# Patient Record
Sex: FEMALE | Race: ASIAN | NOT HISPANIC OR LATINO | Employment: UNEMPLOYED | URBAN - METROPOLITAN AREA
[De-identification: names, ages, dates, MRNs, and addresses within clinical notes are randomized per-mention and may not be internally consistent; named-entity substitution may affect disease eponyms.]

---

## 2023-08-04 ENCOUNTER — ANCILLARY PROCEDURE (OUTPATIENT)
Dept: GENERAL RADIOLOGY | Facility: CLINIC | Age: 48
End: 2023-08-04
Attending: PODIATRIST
Payer: COMMERCIAL

## 2023-08-04 ENCOUNTER — OFFICE VISIT (OUTPATIENT)
Dept: PODIATRY | Facility: CLINIC | Age: 48
End: 2023-08-04
Payer: COMMERCIAL

## 2023-08-04 VITALS
BODY MASS INDEX: 33.52 KG/M2 | SYSTOLIC BLOOD PRESSURE: 117 MMHG | DIASTOLIC BLOOD PRESSURE: 77 MMHG | HEART RATE: 56 BPM | WEIGHT: 214 LBS

## 2023-08-04 DIAGNOSIS — M79.671 PAIN IN BOTH FEET: ICD-10-CM

## 2023-08-04 DIAGNOSIS — M79.671 PAIN IN BOTH FEET: Primary | ICD-10-CM

## 2023-08-04 DIAGNOSIS — M67.874 ACHILLES TENDINOSIS OF BOTH ANKLES: ICD-10-CM

## 2023-08-04 DIAGNOSIS — M77.30 CALCANEAL SPUR, UNSPECIFIED LATERALITY: ICD-10-CM

## 2023-08-04 DIAGNOSIS — M79.672 PAIN IN BOTH FEET: Primary | ICD-10-CM

## 2023-08-04 DIAGNOSIS — M67.873 ACHILLES TENDINOSIS OF BOTH ANKLES: ICD-10-CM

## 2023-08-04 DIAGNOSIS — M79.672 PAIN IN BOTH FEET: ICD-10-CM

## 2023-08-04 PROCEDURE — 99203 OFFICE O/P NEW LOW 30 MIN: CPT | Performed by: PODIATRIST

## 2023-08-04 PROCEDURE — 73630 X-RAY EXAM OF FOOT: CPT | Mod: TC | Performed by: RADIOLOGY

## 2023-08-04 RX ORDER — MELOXICAM 7.5 MG/1
7.5 TABLET ORAL DAILY
Qty: 30 TABLET | Refills: 1 | Status: SHIPPED | OUTPATIENT
Start: 2023-08-04

## 2023-08-04 NOTE — LETTER
8/4/2023         RE: Whitney Koo  [address]  [place] [address]        Dear Colleague,    Thank you for referring your patient, Whitney Koo, to the [facility] [place]. Please see a copy of my visit note below.    PATIENT HISTORY:  Whitney Koo is a 48 year old female who presents to clinic as a self referral with a chief complaint of bilateral foot pain, left is more painful at the achillis insertion.  The patient is seen with their daughter.  The patient relates the pain is primarily located around the achillis insertion of both feet.  Past surgery of the right foot including a calcaneal osteotomy.  The patient relates that the symptoms have been going on for several year(s).  The patient has previously tried different shoes, surgery, ice, home remedies with little relief.  The patient is unemployed.  Any previous notes and studies that pertain to the patient's condition were reviewed.    Pertinent medical, surgical and family history was reviewed in the Epic chart.    Past Medical History:   Past Medical History:   Diagnosis Date     Fibroids        Medications:   Current Outpatient Medications:      ferrous sulfate (SLO-FE) 142 (45 FE) MG TBCR, Take 1 tablet (142 mg) by mouth daily, Disp: 90 tablet, Rfl: 3     meloxicam (MOBIC) 7.5 MG tablet, Take 1 tablet (7.5 mg) by mouth daily, Disp: 30 tablet, Rfl: 1     Prenatal Vit-Fe Fumarate-FA (PRENATAL MULTIVITAMIN  WITH IRON) 28-0.8 MG TABS, Take 1 tablet by mouth daily, Disp: 100 each, Rfl: 2     vitamin  B complex with vitamin C (VITAMIN  B COMPLEX) TABS, Take 1 tablet by mouth daily, Disp: , Rfl:      Allergies:  No Known Allergies    Vitals: /77   Pulse 56   Wt 97.1 kg (214 lb)   BMI 33.52 kg/m    BMI= Body mass index is 33.52 kg/m .    LOWER EXTREMITY PHYSICAL EXAM    Dermatologic: Skin is intact to right and left lower extremity without significant lesions, rash or abrasion.        Vascular: DP & PT pulses are intact &  regular on the right and left.   CFT and skin temperature is normal to the right and left lower extremity.     Neurologic: Lower extremity sensation is intact to light touch.  No evidence of weakness in the right and left lower extremity.        Musculoskeletal: Patient is ambulatory without assistive device or brace.  No gross ankle deformity noted.  No foot or ankle joint effusion is noted.  Noted pain on palpation on the posterior aspect of the calcaneus left greater than right.  Mild edema noted over the posterior aspect the calcaneus bilaterally.  Noted tight gastroc complex bilaterally.    Diagnostics:  Radiographs included three views of the left and right foot demonstrating previous calcaneal osteotomy on the right with stable fixation.  Noted posterior calcaneal spurring on the left.  No cortical erosions or periosteal elevation.  All joint margins appear stable.  There is no apparent fracture or tumor formation noted.  There is no evidence of foreign body.  The images were independently reviewed by myself along with the patient explaining the findings.      ASSESSMENT / PLAN:     ICD-10-CM    1. Pain in both feet  M79.671 XR Foot Bilateral G/E 3 Views    M79.672       2. Achilles tendinosis of both ankles  M67.873 meloxicam (MOBIC) 7.5 MG tablet    M67.874 Heel Lift Order      3. Calcaneal spur, unspecified laterality  M77.30 meloxicam (MOBIC) 7.5 MG tablet     Heel Lift Order          I have explained to Whitney about the conditions.  We discussed the underlying contributing factors to the condition as well as both conservative and surgical treatment options along with expected length of recovery.  At this time, the patient was educated on the importance of offloading supportive shoes and other devices.  I demonstrated to the patient calf stretches to perform every hour daily until symptoms resolve.  After symptoms resolve, the patient was advised to perform the stretches 3 times daily to prevent future  recurrence.  The patient was instructed to perform warm soaks with Epson salt after which to also apply over-the-counter Voltaren gel to deeply massage the injured tissue.  The patient was instructed to do this on a daily basis until symptoms resolve.   The patient was fitted with a quarter inch heel lift that will aid in offloading the tension forces to the soft tissues and prevent further inflammation.  To reduce the amount of current inflammation, the patient was prescribed Mobic 7.5 mg to be taken daily with food and instructed to stop taking if any stomach irritation or swelling in extremities are noted.  The patient may return in four weeks for reevaluation to determine if any further treatment will be needed.      Whitney verbalized agreement with and understanding of the rational for the diagnosis and treatment plan.  All questions were answered to best of my ability and the patient's satisfaction. The patient was advised to contact the clinic with any questions that may arise after the clinic visit.      Disclaimer: This note consists of symbols derived from keyboarding, dictation and/or voice recognition software. As a result, there may be errors in the script that have gone undetected. Please consider this when interpreting information found in this chart.       ARNOLD Mims.P.M., F.A.C.F.A.S.      Again, thank you for allowing me to participate in the care of your patient.        Sincerely,        Ajith Farmer DPM

## 2023-08-04 NOTE — PATIENT INSTRUCTIONS

## 2023-08-04 NOTE — PROGRESS NOTES
PATIENT HISTORY:  Whitney Koo is a 48 year old female who presents to clinic as a self referral with a chief complaint of bilateral foot pain, left is more painful at the achillis insertion.  The patient is seen with their daughter.  The patient relates the pain is primarily located around the achillis insertion of both feet.  Past surgery of the right foot including a calcaneal osteotomy.  The patient relates that the symptoms have been going on for several year(s).  The patient has previously tried different shoes, surgery, ice, home remedies with little relief.  The patient is unemployed.  Any previous notes and studies that pertain to the patient's condition were reviewed.    Pertinent medical, surgical and family history was reviewed in the [facility] chart.    Past Medical History:   Past Medical History:   Diagnosis Date    Fibroids        Medications:   Current Outpatient Medications:     ferrous sulfate (SLO-FE) 142 (45 FE) MG TBCR, Take 1 tablet (142 mg) by mouth daily, Disp: 90 tablet, Rfl: 3    meloxicam (MOBIC) 7.5 MG tablet, Take 1 tablet (7.5 mg) by mouth daily, Disp: 30 tablet, Rfl: 1    Prenatal Vit-Fe Fumarate-FA (PRENATAL MULTIVITAMIN  WITH IRON) 28-0.8 MG TABS, Take 1 tablet by mouth daily, Disp: 100 each, Rfl: 2    vitamin  B complex with vitamin C (VITAMIN  B COMPLEX) TABS, Take 1 tablet by mouth daily, Disp: , Rfl:      Allergies:  No Known Allergies    Vitals: /77   Pulse 56   Wt 97.1 kg (214 lb)   BMI 33.52 kg/m    BMI= Body mass index is 33.52 kg/m .    LOWER EXTREMITY PHYSICAL EXAM    Dermatologic: Skin is intact to right and left lower extremity without significant lesions, rash or abrasion.        Vascular: DP & PT pulses are intact & regular on the right and left.   CFT and skin temperature is normal to the right and left lower extremity.     Neurologic: Lower extremity sensation is intact to light touch.  No evidence of weakness in the right and left lower extremity.         Musculoskeletal: Patient is ambulatory without assistive device or brace.  No gross ankle deformity noted.  No foot or ankle joint effusion is noted.  Noted pain on palpation on the posterior aspect of the calcaneus left greater than right.  Mild edema noted over the posterior aspect the calcaneus bilaterally.  Noted tight gastroc complex bilaterally.    Diagnostics:  Radiographs included three views of the left and right foot demonstrating previous calcaneal osteotomy on the right with stable fixation.  Noted posterior calcaneal spurring on the left.  No cortical erosions or periosteal elevation.  All joint margins appear stable.  There is no apparent fracture or tumor formation noted.  There is no evidence of foreign body.  The images were independently reviewed by myself along with the patient explaining the findings.      ASSESSMENT / PLAN:     ICD-10-CM    1. Pain in both feet  M79.671 XR Foot Bilateral G/E 3 Views    M79.672       2. Achilles tendinosis of both ankles  M67.873 meloxicam (MOBIC) 7.5 MG tablet    M67.874 Heel Lift Order      3. Calcaneal spur, unspecified laterality  M77.30 meloxicam (MOBIC) 7.5 MG tablet     Heel Lift Order          I have explained to Whitney about the conditions.  We discussed the underlying contributing factors to the condition as well as both conservative and surgical treatment options along with expected length of recovery.  At this time, the patient was educated on the importance of offloading supportive shoes and other devices.  I demonstrated to the patient calf stretches to perform every hour daily until symptoms resolve.  After symptoms resolve, the patient was advised to perform the stretches 3 times daily to prevent future recurrence.  The patient was instructed to perform warm soaks with Epson salt after which to also apply over-the-counter Voltaren gel to deeply massage the injured tissue.  The patient was instructed to do this on a daily basis until symptoms  resolve.   The patient was fitted with a quarter inch heel lift that will aid in offloading the tension forces to the soft tissues and prevent further inflammation.  To reduce the amount of current inflammation, the patient was prescribed Mobic 7.5 mg to be taken daily with food and instructed to stop taking if any stomach irritation or swelling in extremities are noted.  The patient may return in four weeks for reevaluation to determine if any further treatment will be needed.      Whitney verbalized agreement with and understanding of the rational for the diagnosis and treatment plan.  All questions were answered to best of my ability and the patient's satisfaction. The patient was advised to contact the clinic with any questions that may arise after the clinic visit.      Disclaimer: This note consists of symbols derived from keyboarding, dictation and/or voice recognition software. As a result, there may be errors in the script that have gone undetected. Please consider this when interpreting information found in this chart.       MIGUELINA Farmer D.P.M., F.SYLVIE.F.A.S.

## 2023-12-15 ENCOUNTER — OFFICE VISIT (OUTPATIENT)
Dept: PODIATRY | Facility: CLINIC | Age: 48
End: 2023-12-15
Payer: COMMERCIAL

## 2023-12-15 VITALS
HEART RATE: 68 BPM | SYSTOLIC BLOOD PRESSURE: 104 MMHG | DIASTOLIC BLOOD PRESSURE: 70 MMHG | HEIGHT: 67 IN | WEIGHT: 214 LBS | BODY MASS INDEX: 33.59 KG/M2

## 2023-12-15 DIAGNOSIS — M67.874 ACHILLES TENDINOSIS OF BOTH ANKLES: Primary | ICD-10-CM

## 2023-12-15 DIAGNOSIS — M67.873 ACHILLES TENDINOSIS OF BOTH ANKLES: Primary | ICD-10-CM

## 2023-12-15 PROCEDURE — 99213 OFFICE O/P EST LOW 20 MIN: CPT | Performed by: PODIATRIST

## 2023-12-15 NOTE — PROGRESS NOTES
"Whitney returns to the office for reevaluation of the right and left foot.  The patient relates following the instructions given at the last visit with noted overall continued pain and minimal improvement in function of the right and left foot.   The patient relates no other problems.    Vitals: /70   Pulse 68   Ht 1.702 m (5' 7\")   Wt 97.1 kg (214 lb)   BMI 33.52 kg/m    BMI= Body mass index is 33.52 kg/m .    Lower Extremity Physical Exam:      Neurovascular status remains unchanged.  Muscular exam is within normal limits to major muscle groups.  Integument is intact.      Noted pain on palpation over the posterior aspect of both heels near the insertion point of the Achilles tendons.  Noted tight gastroc complex bilaterally.         Assessment:     ICD-10-CM    1. Achilles tendinosis of both ankles  M67.873 Physical Therapy Referral    M67.874           Plan:    I have explained to Whitney about the progress of the conditions.  At this time, the patient was educated on the importance of offloading supportive shoes and other devices.  I demonstrated to the patient calf stretches to perform every hour daily until symptoms resolve.  After symptoms resolve, the patient was advised to perform the stretches 3 times daily to prevent future recurrence.  The patient was instructed to perform warm soaks with Epson salt after which to also apply over-the-counter Voltaren gel to deeply massage the injured tissue.  The patient was instructed to do this on a daily basis until symptoms resolve.  The patient was referred to Physical therapy for Graston deep tissue fascial release of both gastroc aponeurosis and Achilles tendons.  The patient may return in four weeks for reevaluation to determine if any further treatment will be needed.      Whitney verbalized agreement with and understanding of the rational for the diagnosis and treatment plan.  All questions were answered to best of my ability and the patient's satisfaction. " The patient was advised to contact the clinic with any questions that may arise after the clinic visit.      Disclaimer: This note consists of symbols derived from keyboarding, dictation and/or voice recognition software. As a result, there may be errors in the script that have gone undetected. Please consider this when interpreting information found in this chart.       MIGUELINA Farmer D.P.M., [email or web address].

## 2023-12-15 NOTE — NURSING NOTE
"Chief Complaint   Patient presents with    RECHECK     Left foot pain- was improving for a little while then she started to get back pain and couldn't do the stretches and the pain has returened       Initial /70   Pulse 68   Ht 1.702 m (5' 7\")   Wt 97.1 kg (214 lb)   BMI 33.52 kg/m   Estimated body mass index is 33.52 kg/m  as calculated from the following:    Height as of this encounter: 1.702 m (5' 7\").    Weight as of this encounter: 97.1 kg (214 lb).  Medications and allergies reviewed.      Tatiana BARRETT MA    "

## 2023-12-15 NOTE — PATIENT INSTRUCTIONS

## 2023-12-15 NOTE — Clinical Note
12/15/2023         RE: Whitney Koo  [address]  [place] [address]        Dear Colleague,    Thank you for referring your patient, Whitney Koo, to the [facility] [place]. Please see a copy of my visit note below.    Whitney returns to the office for reevaluation of the {RIGHT /LEFT:403294} foot.  The patient relates following the instructions given at the last visit with noted overall {LESS/MORE:735507} pain and {LESS/MORE:158948} improvement in function of the {RIGHT /LEFT:410359} foot.   The patient relates no other problems.    Vitals: There were no vitals taken for this visit.  BMI= There is no height or weight on file to calculate BMI.    Lower Extremity Physical Exam:      Neurovascular status remains unchanged.  Muscular exam is within normal limits to major muscle groups.  Integument is intact.      Noted ***    Diagnostics:  Radiograph evaluation including three views of the {RIGHT /LEFT:664892} foot reveals interval healing with increased trabeculation of the ***.  I personally evaluated the images as well as reviewed the images with the patient pointing out the findings.      Assessment:     ICD-10-CM    1. Achilles tendinosis of both ankles  M67.873     M67.874           Plan:    I have explained to Whitney about the progress of the conditions.  At this time, ***    Whitney verbalized agreement with and understanding of the rational for the diagnosis and treatment plan.  All questions were answered to best of my ability and the patient's satisfaction. The patient was advised to contact the clinic with any questions that may arise after the clinic visit.      Disclaimer: This note consists of symbols derived from keyboarding, dictation and/or voice recognition software. As a result, there may be errors in the script that have gone undetected. Please consider this when interpreting information found in this chart.       MIGUELINA Farmer D.P.M., F.A.C.F.A.S.        Again, thank  you for allowing me to participate in the care of your patient.        Sincerely,        Ajith Farmer DPM

## 2024-01-04 ENCOUNTER — THERAPY VISIT (OUTPATIENT)
Dept: PHYSICAL THERAPY | Facility: CLINIC | Age: 49
End: 2024-01-04
Attending: PODIATRIST
Payer: COMMERCIAL

## 2024-01-04 DIAGNOSIS — M67.874 ACHILLES TENDINOSIS OF BOTH ANKLES: ICD-10-CM

## 2024-01-04 DIAGNOSIS — M67.873 ACHILLES TENDINOSIS OF BOTH ANKLES: ICD-10-CM

## 2024-01-04 PROCEDURE — 97110 THERAPEUTIC EXERCISES: CPT | Mod: GP | Performed by: PHYSICAL THERAPIST

## 2024-01-04 PROCEDURE — 97161 PT EVAL LOW COMPLEX 20 MIN: CPT | Mod: GP | Performed by: PHYSICAL THERAPIST

## 2024-01-04 NOTE — PROGRESS NOTES
PHYSICAL THERAPY EVALUATION  Type of Visit: Evaluation    See electronic medical record for Abuse and Falls Screening details.      Pt presents to PT with complaints of bilateral foot pain R>L, near achilles insertion and posterolateral R ankle.  The patient is seen with their daughter.   Past surgery of the right foot including a calcaneal osteotomy.  The patient relates that the symptoms have been going on for several year(s).  The patient has previously tried different shoes, surgery, ice, home remedies with little relief.  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    Subjective       Presenting condition or subjective complaint: B ankle pain  Date of onset: 11/01/22    Relevant medical history:     Dates & types of surgery: calcaneal osteotomy 10/8/22    Prior diagnostic imaging/testing results: X-ray     Prior therapy history for the same diagnosis, illness or injury: No      Prior Level of Function  Transfers:   Ambulation:   ADL:   IADL:     Living Environment  Social support: With a significant other or spouse   Type of home: [facility]; 2-story   Stairs to enter the home: Yes       Ramp: No   Stairs inside the home: Yes 12     Help at home: [employer] (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, family, etc)  Equipment owned:       Employment: No    Hobbies/Interests:      Patient goals for therapy: walk longer distances    Pain assessment: pain in R>L achilles and posterolateral ankle     Objective   FOOT/ANKLE EVALUATION  PAIN:   INTEGUMENTARY (edema, incisions):   POSTURE:   GAIT:   Weightbearing Status: WBAT  Assistive Device(s): None  Gait Deviations:  B ER, antalgic with decreased stance time on R  BALANCE/PROPRIOCEPTION:   WEIGHT BEARING ALIGNMENT:   NON-WEIGHTBEARING ALIGNMENT:    ROM: L df: 12degs pf: 50degs inv: 30degs ev:23degs R: df: 9degs pf: 45degs inv:27degs ev:30degs   STRENGTH: WNL  FLEXIBILITY: L>R achilles soft tissue tightness  SPECIAL TESTS: varus and valgus tests  negative  FUNCTIONAL TESTS:   PALPATION: mild swelling in postero lateral R ankle, TTP R>L calcaneal insertion  JOINT MOBILITY: WNL TC, MT, ST joints    Assessment & Plan   CLINICAL IMPRESSIONS  Medical Diagnosis: B achilles tendonitis    Treatment Diagnosis: B achilles tendonitis   Impression/Assessment: Patient is a 48 year old female with B ankle pain complaints.  The following significant findings have been identified: Pain, Decreased ROM/flexibility, Impaired balance, and Impaired gait. These impairments interfere with their ability to perform household chores, household mobility, and community mobility as compared to previous level of function.     Clinical Decision Making (Complexity):  Clinical Presentation: Stable/Uncomplicated  Clinical Presentation Rationale: based on medical and personal factors listed in PT evaluation  Clinical Decision Making (Complexity): Low complexity    PLAN OF CARE  Treatment Interventions:  Modalities: Cryotherapy, Hot Pack  Interventions: Gait Training, Manual Therapy, Neuromuscular Re-education, Therapeutic Activity, Therapeutic Exercise    Long Term Goals     PT Goal 1  Goal Identifier: walking  Goal Description: pt will be able to walk 30mins including stairs, painfree.  Rationale: to maximize safety and independence within the community  Target Date: 03/28/24      Frequency of Treatment: 1x/wk  Duration of Treatment: 12wks    Recommended Referrals to Other Professionals:   Education Assessment:   Learner/Method: Patient;Demonstration;Pictures/Video  Education Comments: ankle anatomy and mechanics, rehab progression and expectations    Risks and benefits of evaluation/treatment have been explained.   Patient/Family/caregiver agrees with Plan of Care.     Evaluation Time:     PT Eval, Low Complexity Minutes (90954): 15       Signing Clinician: Joe Da Silva PT      [facility]                                                                                    OUTPATIENT PHYSICAL THERAPY      PLAN OF TREATMENT FOR OUTPATIENT REHABILITATION   Patient's Last Name, First Name, Whitney Cartagena    YOB: 1975   Provider's Name   [facility]   Medical Record No.  [id number]     Onset Date: 11/01/22  Start of Care Date: 01/04/24     Medical Diagnosis:  B achilles tendonitis      PT Treatment Diagnosis:  B achilles tendonitis Plan of Treatment  Frequency/Duration: 1x/wk/ 12wks    Certification date from 01/04/24 to 03/28/24         See note for plan of treatment details and functional goals     Joe Da Silva, PT                         I CERTIFY THE NEED FOR THESE SERVICES FURNISHED UNDER        THIS PLAN OF TREATMENT AND WHILE UNDER MY CARE .             Physician Signature               Date    X_____________________________________________________                  Referring Provider:  Ajith Farmer    Initial Assessment  See Epic Evaluation- Start of Care Date: 01/04/24